# Patient Record
(demographics unavailable — no encounter records)

---

## 2025-02-06 NOTE — DISCUSSION/SUMMARY
[FreeTextEntry1] : 8 year old with concentrated urine, constipation  Supportive care Increase fluids UA negative  Increase fiber

## 2025-02-06 NOTE — HISTORY OF PRESENT ILLNESS
[ Symptoms] :  SYMPTOMS [de-identified] : pain on urination  [FreeTextEntry6] : for a few days Has happened before no vomiting not emptying bladder doesn't want to use bathroom at school constipated

## 2025-02-06 NOTE — HISTORY OF PRESENT ILLNESS
[ Symptoms] :  SYMPTOMS [de-identified] : pain on urination  [FreeTextEntry6] : for a few days Has happened before no vomiting not emptying bladder doesn't want to use bathroom at school constipated